# Patient Record
Sex: FEMALE | Race: WHITE | NOT HISPANIC OR LATINO | Employment: FULL TIME | URBAN - METROPOLITAN AREA
[De-identification: names, ages, dates, MRNs, and addresses within clinical notes are randomized per-mention and may not be internally consistent; named-entity substitution may affect disease eponyms.]

---

## 2017-05-17 ENCOUNTER — HOSPITAL ENCOUNTER (OUTPATIENT)
Facility: MEDICAL CENTER | Age: 27
End: 2017-05-17
Attending: NURSE PRACTITIONER

## 2017-05-17 ENCOUNTER — OFFICE VISIT (OUTPATIENT)
Dept: URGENT CARE | Facility: CLINIC | Age: 27
End: 2017-05-17

## 2017-05-17 VITALS
BODY MASS INDEX: 20.83 KG/M2 | HEIGHT: 65 IN | DIASTOLIC BLOOD PRESSURE: 60 MMHG | HEART RATE: 59 BPM | OXYGEN SATURATION: 95 % | WEIGHT: 125 LBS | SYSTOLIC BLOOD PRESSURE: 98 MMHG | TEMPERATURE: 99.9 F | RESPIRATION RATE: 14 BRPM

## 2017-05-17 DIAGNOSIS — N89.8 VAGINAL ODOR: ICD-10-CM

## 2017-05-17 LAB
CANDIDA DNA VAG QL PROBE+SIG AMP: NEGATIVE
G VAGINALIS DNA VAG QL PROBE+SIG AMP: POSITIVE
T VAGINALIS DNA VAG QL PROBE+SIG AMP: NEGATIVE

## 2017-05-17 PROCEDURE — 87480 CANDIDA DNA DIR PROBE: CPT

## 2017-05-17 PROCEDURE — 87510 GARDNER VAG DNA DIR PROBE: CPT

## 2017-05-17 PROCEDURE — 99202 OFFICE O/P NEW SF 15 MIN: CPT | Performed by: NURSE PRACTITIONER

## 2017-05-17 PROCEDURE — 87660 TRICHOMONAS VAGIN DIR PROBE: CPT

## 2017-05-17 ASSESSMENT — ENCOUNTER SYMPTOMS
CHILLS: 0
NAUSEA: 0
FLANK PAIN: 0
ABDOMINAL PAIN: 0
VAGINITIS: 1
FEVER: 0

## 2017-05-17 NOTE — MR AVS SNAPSHOT
"Naina Wilbert   2017 12:00 PM   Office Visit   MRN: [id number]    Department:  [facility]   Dept Phone:  [phone]    Description:  Female : 1990   Provider:  RADHA Amado           Reason for Visit     Vaginitis x months      Allergies as of 2017     Allergen Noted Reactions    Amoxicillin 2017       Penicillin G 2017         You were diagnosed with     Vaginal odor   [035970]         Vital Signs     Blood Pressure Pulse Temperature Respirations Height Weight    98/60 mmHg 59 37.7 °C (99.9 °F) 14 1.651 m (5' 5\") 56.7 kg (125 lb)    Body Mass Index Oxygen Saturation Breastfeeding? Smoking Status          20.80 kg/m2 95% No Current Some Day Smoker        Basic Information     Date Of Birth Sex Race Ethnicity Preferred Language    1990 Female White Non- English      Health Maintenance     Patient has no pending health maintenance at this time      Current Immunizations     No immunizations on file.      Below and/or attached are the medications your provider expects you to take. Review all of your home medications and newly ordered medications with your provider and/or pharmacist. Follow medication instructions as directed by your provider and/or pharmacist. Please keep your medication list with you and share with your provider. Update the information when medications are discontinued, doses are changed, or new medications (including over-the-counter products) are added; and carry medication information at all times in the event of emergency situations     Allergies:  AMOXICILLIN - (reactions not documented)     PENICILLIN G - (reactions not documented)               Medications  Valid as of: May 17, 2017 - 12:31 PM    Generic Name Brand Name Tablet Size Instructions for use    .                 Medicines prescribed today were sent to:     None      Medication refill instructions:       If your prescription bottle indicates you have medication " refills left, it is not necessary to call your provider’s office. Please contact your pharmacy and they will refill your medication.    If your prescription bottle indicates you do not have any refills left, you may request refills at any time through one of the following ways: The online Friendly Wager App system (except Urgent Care), by calling your provider’s office, or by asking your pharmacy to contact your provider’s office with a refill request. Medication refills are processed only during regular business hours and may not be available until the next business day. Your provider may request additional information or to have a follow-up visit with you prior to refilling your medication.   *Please Note: Medication refills are assigned a new Rx number when refilled electronically. Your pharmacy may indicate that no refills were authorized even though a new prescription for the same medication is available at the pharmacy. Please request the medicine by name with the pharmacy before contacting your provider for a refill.        Your To Do List     Future Labs/Procedures Complete By Expires    VAGINAL PATHOGENS DNA PANEL  As directed 5/17/2018         Friendly Wager App Status: Patient Declined        Quit Tobacco Information     Do you want to quit using tobacco?    Quitting tobacco decreases risks of cancer, heart and lung disease, increases life expectancy, improves sense of taste and smell, and increases spending money, among other benefits.    If you are thinking about quitting, we can help.  • [facility] Quit Tobacco Program: 195.924.2634  o Program occurs weekly for four weeks and includes pharmacist consultation on products to support quitting smoking or chewing tobacco. A provider referral is needed for pharmacist consultation.  • Tobacco Users Help Hotline: 4-709-QUIT-NOW (823-3588) or https://[place].quitlogix.org/  o Free, confidential telephone and online coaching for [place] residents. Sessions are designed on a schedule that  is convenient for you. Eligible clients receive free nicotine replacement therapy.  • Nationally: www.smokefree.gov  o Information and professional assistance to support both immediate and long-term needs as you become, and remain, a non-smoker. Smokefree.gov allows you to choose the help that best fits your needs.

## 2017-05-17 NOTE — PROGRESS NOTES
"Subjective:      Naina Atkins is a 26 y.o. female who presents with Vaginitis            Vaginitis  The patient's primary symptoms include a genital odor and vaginal discharge. This is a new problem. The current episode started 1 to 4 weeks ago. The problem occurs constantly. The problem has been unchanged. The patient is experiencing no pain. She is not pregnant. Pertinent negatives include no abdominal pain, chills, discolored urine, fever, flank pain, nausea, painful intercourse or urgency. There has been no bleeding. She is sexually active with multiple partners. It is possible that her partner has an STD.   Allergies, medications and history reviewed by me today      Review of Systems   Constitutional: Negative for fever and chills.   Gastrointestinal: Negative for nausea and abdominal pain.   Genitourinary: Positive for vaginal discharge. Negative for urgency and flank pain.          Objective:     BP 98/60 mmHg  Pulse 59  Temp(Src) 37.7 °C (99.9 °F)  Resp 14  Ht 1.651 m (5' 5\")  Wt 56.7 kg (125 lb)  BMI 20.80 kg/m2  SpO2 95%  Breastfeeding? No     Physical Exam   Constitutional: She is oriented to person, place, and time. She appears well-developed and well-nourished. No distress.   Cardiovascular: Normal rate, regular rhythm and normal heart sounds.    No murmur heard.  Pulmonary/Chest: Effort normal and breath sounds normal.   Genitourinary: Vaginal discharge found.   Musculoskeletal: Normal range of motion.   Neurological: She is alert and oriented to person, place, and time.   Skin: Skin is warm and dry. No rash noted. No erythema.   Psychiatric: She has a normal mood and affect. Her behavior is normal. Thought content normal.   Nursing note and vitals reviewed.              Assessment/Plan:     1. Vaginal odor  VAGINAL PATHOGENS DNA PANEL     Will call with results, ok to leave message.      "

## 2017-05-19 ENCOUNTER — TELEPHONE (OUTPATIENT)
Dept: URGENT CARE | Facility: CLINIC | Age: 27
End: 2017-05-19

## 2017-05-19 DIAGNOSIS — N76.0 BACTERIAL VAGINOSIS: ICD-10-CM

## 2017-05-19 DIAGNOSIS — B96.89 BACTERIAL VAGINOSIS: ICD-10-CM

## 2017-05-19 RX ORDER — METRONIDAZOLE 500 MG/1
500 TABLET ORAL 2 TIMES DAILY
Qty: 14 TAB | Refills: 0 | Status: SHIPPED | OUTPATIENT
Start: 2017-05-19 | End: 2017-05-26

## 2018-03-13 ENCOUNTER — OFFICE VISIT (OUTPATIENT)
Dept: MEDICAL GROUP | Facility: CLINIC | Age: 28
End: 2018-03-13
Payer: MEDICAID

## 2018-03-13 ENCOUNTER — HOSPITAL ENCOUNTER (OUTPATIENT)
Facility: MEDICAL CENTER | Age: 28
End: 2018-03-13
Attending: NURSE PRACTITIONER
Payer: MEDICAID

## 2018-03-13 VITALS
TEMPERATURE: 99 F | OXYGEN SATURATION: 99 % | HEART RATE: 66 BPM | BODY MASS INDEX: 20.83 KG/M2 | SYSTOLIC BLOOD PRESSURE: 120 MMHG | RESPIRATION RATE: 16 BRPM | DIASTOLIC BLOOD PRESSURE: 60 MMHG | WEIGHT: 125 LBS | HEIGHT: 65 IN

## 2018-03-13 DIAGNOSIS — Z01.419 WELL WOMAN EXAM WITH ROUTINE GYNECOLOGICAL EXAM: ICD-10-CM

## 2018-03-13 DIAGNOSIS — R53.83 FATIGUE, UNSPECIFIED TYPE: ICD-10-CM

## 2018-03-13 LAB — CYTOLOGY REG CYTOL: NORMAL

## 2018-03-13 PROCEDURE — 83540 ASSAY OF IRON: CPT

## 2018-03-13 PROCEDURE — 85025 COMPLETE CBC W/AUTO DIFF WBC: CPT

## 2018-03-13 PROCEDURE — 99000 SPECIMEN HANDLING OFFICE-LAB: CPT | Performed by: NURSE PRACTITIONER

## 2018-03-13 PROCEDURE — 88175 CYTOPATH C/V AUTO FLUID REDO: CPT

## 2018-03-13 PROCEDURE — 99395 PREV VISIT EST AGE 18-39: CPT | Performed by: NURSE PRACTITIONER

## 2018-03-13 PROCEDURE — 83550 IRON BINDING TEST: CPT

## 2018-03-13 PROCEDURE — 84443 ASSAY THYROID STIM HORMONE: CPT

## 2018-03-13 ASSESSMENT — PAIN SCALES - GENERAL: PAINLEVEL: NO PAIN

## 2018-03-13 ASSESSMENT — PATIENT HEALTH QUESTIONNAIRE - PHQ9: CLINICAL INTERPRETATION OF PHQ2 SCORE: 0

## 2018-03-13 NOTE — PROGRESS NOTES
CC:  Pap/Well Woman Exam    HPI:     Naina Atkins is 27 y.o female presenting today for well woman exam with gynecological exam and Pap smear.   She reports her periods are regular, mothly.  She is currently using   for birth control. She reports several sexual partners in the last 6 months, reports also using protection, she is tested weekly for G/C per her job regulations.     Fatigue  This is a new problem. Patient does admit to fatigue, no matter how often she is able to get sleep. 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 Denies any history of anemia, thyroid. Denies alcohol use. She does smoke marijuana. Denies any history of syncope, seizure.      She  has no past medical history of Asthma.     She  has a past surgical history that includes appendectomy.     History   Sexual Activity   • Sexual activity: Yes   • Partners: Female, Male   • Birth control/ protection: Pill       No outpatient encounter prescriptions on file as of 3/13/2018.     No facility-administered encounter medications on file as of 3/13/2018.        Patient Active Problem List    Diagnosis Date Noted   • Fatigue 03/13/2018       .  Social History     Social History   • Marital status: Single     Spouse name: N/A   • Number of children: N/A   • Years of education: N/A     Occupational History   • Not on file.     Social History Main Topics   • Smoking status: Current Some Day Smoker     Types: Cigarettes   • Smokeless tobacco: Never Used      Comment: very rare   • Alcohol use Yes      Comment: on occ   • Drug use: Yes     Types: Marijuana   • Sexual activity: Yes     Partners: Female, Male     Birth control/ protection: Pill     Other Topics Concern   • Not on file     Social History Narrative   • No narrative on file       Family History   Problem Relation Age of Onset   • No Known Problems Mother    • No Known Problems Father    • Cancer Maternal Grandmother      breast         ROS: Positive for fatigue. Denies Weight  "loss, chest pain, SOB, bowel or bladder changes. No significant dysmenorrhea, concerning vaginal discharge or irritation, no dyspareunia or postcoital bleeding. Denies h/o migraine with aura. Denies musculoskeletal, neurological, or psychiatric problems.    All other systems reviewed and are negative.       /60   Pulse 66   Temp 37.2 °C (99 °F)   Resp 16   Ht 1.651 m (5' 5\")   Wt 56.7 kg (125 lb)   LMP 02/09/2018   SpO2 99%   Breastfeeding? No   BMI 20.80 kg/m²     Physical Exam:  Dian Valverde MA present during exam.     GEN:  Appears well and in no apparent distress   NECK:  Supple without adenopathy or thyromegaly  LUNGS:  Clear and equal. No wheeze, ronchi, or rales.  CV:  RRR, S1, S2. No murmur.  Pedal pulses 2+ bilaterally.  BREAST:  Declines exam, denies any complaints   ABD:  Soft, non-tender, non-distended, normal bowel sounds.  No hepatosplenomegaly.  :  Normal external female genitalia.  Vaginal canal clear.  Cervix appears without polyps. Nabothian cysts noted 4 oclock, there is some what appears to be trauma near OS. Specimen collected from transformation zone. Bimanual exam:  No CMT, normal size uterus without masses or tenderness; no adnexal masses or tenderness.       Assessment and plan:    1. Well woman exam with routine gynecological exam  Will contact with results.   - THINPREP PAP,REFLEX HPV ON ASC-US ONLY; Future    2. Fatigue, unspecified type  - CBC WITH DIFFERENTIAL; Future  - TSH WITH REFLEX TO FT4; Future  - IRON/TOTAL IRON BIND; Future       Reviewed risks and benefits of treatment plan. Patient verbally agrees to plan of care.       F/u pending results  Return for Lab follow up.    ALFREDITO Linda.     PLEASE NOTE: This dictation was created using voice recognition software. I have made every reasonable attempt to correct obvious errors, but I expect that there may be errors of grammar and possibly content that I did not discover prior finalizing this " note.

## 2018-03-13 NOTE — PATIENT INSTRUCTIONS
Your medical care was provided today by: NICOLA Knowles    Thank You for the opportunity to serve you.    You may receive a brief survey in the mail shortly regarding your visit today. Please take a few moments to complete the survey and return it; no postage is necessary. We are working to serve our patient population better, improve customer service and our patients overall experience and your input can help us to accomplish this. We thank you for your help and for the opportunity to serve you today and in the future.     Special Instructions:  Always call 9-1-1 immediately if you develop a life threatening emergency.    Unless told otherwise please take all medications as directed and complete prescription therapies.     Watch for the following signs that require additional evaluation: progressive lethargy or unresponsiveness, localized pain (chest, abdomen), shortness of breath, painful breathing, progressive vomiting with weakness, bloody stools, or new rash.     If you are prescribed pain medication or any other medication that is sedating, do not take medication before or while operating a vehicle or heavy machinery or equipment due to potential side effects such as drowsiness and/or dizziness.

## 2018-03-13 NOTE — ASSESSMENT & PLAN NOTE
This is a new problem. Patient does admit to fatigue, no matter how often she is able to get sleep. 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 Denies any history of anemia, thyroid. Denies alcohol use. She does smoke marijuana. Denies any history of syncope, seizure.

## 2018-03-14 LAB
BASOPHILS # BLD AUTO: 0.5 % (ref 0–1.8)
BASOPHILS # BLD: 0.04 K/UL (ref 0–0.12)
EOSINOPHIL # BLD AUTO: 0.42 K/UL (ref 0–0.51)
EOSINOPHIL NFR BLD: 5.1 % (ref 0–6.9)
ERYTHROCYTE [DISTWIDTH] IN BLOOD BY AUTOMATED COUNT: 46.9 FL (ref 35.9–50)
HCT VFR BLD AUTO: 45.4 % (ref 37–47)
HGB BLD-MCNC: 14.3 G/DL (ref 12–16)
IMM GRANULOCYTES # BLD AUTO: 0.03 K/UL (ref 0–0.11)
IMM GRANULOCYTES NFR BLD AUTO: 0.4 % (ref 0–0.9)
IRON SATN MFR SERPL: 31 % (ref 15–55)
IRON SERPL-MCNC: 113 UG/DL (ref 40–170)
LYMPHOCYTES # BLD AUTO: 2.21 K/UL (ref 1–4.8)
LYMPHOCYTES NFR BLD: 26.6 % (ref 22–41)
MCH RBC QN AUTO: 29.7 PG (ref 27–33)
MCHC RBC AUTO-ENTMCNC: 31.5 G/DL (ref 33.6–35)
MCV RBC AUTO: 94.2 FL (ref 81.4–97.8)
MONOCYTES # BLD AUTO: 0.49 K/UL (ref 0–0.85)
MONOCYTES NFR BLD AUTO: 5.9 % (ref 0–13.4)
NEUTROPHILS # BLD AUTO: 5.11 K/UL (ref 2–7.15)
NEUTROPHILS NFR BLD: 61.5 % (ref 44–72)
NRBC # BLD AUTO: 0 K/UL
NRBC BLD-RTO: 0 /100 WBC
PLATELET # BLD AUTO: 239 K/UL (ref 164–446)
PMV BLD AUTO: 12.8 FL (ref 9–12.9)
RBC # BLD AUTO: 4.82 M/UL (ref 4.2–5.4)
TIBC SERPL-MCNC: 370 UG/DL (ref 250–450)
TSH SERPL DL<=0.005 MIU/L-ACNC: 1.64 UIU/ML (ref 0.38–5.33)
WBC # BLD AUTO: 8.3 K/UL (ref 4.8–10.8)

## 2018-09-13 ENCOUNTER — OFFICE VISIT (OUTPATIENT)
Dept: URGENT CARE | Facility: CLINIC | Age: 28
End: 2018-09-13

## 2018-09-13 ENCOUNTER — HOSPITAL ENCOUNTER (OUTPATIENT)
Facility: MEDICAL CENTER | Age: 28
End: 2018-09-13
Attending: PHYSICIAN ASSISTANT

## 2018-09-13 VITALS
WEIGHT: 131.8 LBS | RESPIRATION RATE: 14 BRPM | DIASTOLIC BLOOD PRESSURE: 70 MMHG | HEART RATE: 70 BPM | BODY MASS INDEX: 21.96 KG/M2 | SYSTOLIC BLOOD PRESSURE: 122 MMHG | TEMPERATURE: 98.3 F | HEIGHT: 65 IN | OXYGEN SATURATION: 97 %

## 2018-09-13 DIAGNOSIS — N30.00 ACUTE CYSTITIS WITHOUT HEMATURIA: ICD-10-CM

## 2018-09-13 LAB
APPEARANCE UR: CLEAR
BILIRUB UR STRIP-MCNC: ABNORMAL MG/DL
COLOR UR AUTO: YELLOW
GLUCOSE UR STRIP.AUTO-MCNC: ABNORMAL MG/DL
KETONES UR STRIP.AUTO-MCNC: ABNORMAL MG/DL
LEUKOCYTE ESTERASE UR QL STRIP.AUTO: ABNORMAL
NITRITE UR QL STRIP.AUTO: ABNORMAL
PH UR STRIP.AUTO: 70 [PH] (ref 5–8)
PROT UR QL STRIP: ABNORMAL MG/DL
RBC UR QL AUTO: ABNORMAL
SP GR UR STRIP.AUTO: 1.02
UROBILINOGEN UR STRIP-MCNC: ABNORMAL MG/DL

## 2018-09-13 PROCEDURE — 99214 OFFICE O/P EST MOD 30 MIN: CPT | Performed by: PHYSICIAN ASSISTANT

## 2018-09-13 PROCEDURE — 87086 URINE CULTURE/COLONY COUNT: CPT

## 2018-09-13 PROCEDURE — 81002 URINALYSIS NONAUTO W/O SCOPE: CPT | Performed by: PHYSICIAN ASSISTANT

## 2018-09-13 RX ORDER — FLUCONAZOLE 150 MG/1
TABLET ORAL
Qty: 2 TAB | Refills: 0 | Status: SHIPPED | OUTPATIENT
Start: 2018-09-13

## 2018-09-13 RX ORDER — NITROFURANTOIN 25; 75 MG/1; MG/1
100 CAPSULE ORAL EVERY 12 HOURS
Qty: 10 CAP | Refills: 0 | Status: SHIPPED | OUTPATIENT
Start: 2018-09-13 | End: 2018-09-18

## 2018-09-13 ASSESSMENT — ENCOUNTER SYMPTOMS
FLANK PAIN: 0
FEVER: 0
VOMITING: 0
CHILLS: 0
NAUSEA: 0
SHORTNESS OF BREATH: 0
DIARRHEA: 0
ABDOMINAL PAIN: 0

## 2018-09-13 NOTE — PROGRESS NOTES
"Subjective:   Naina Atkins is a 27 y.o. female who presents for UTI (x 2 weeks )        UTI   This is a recurrent problem. The current episode started 1 to 4 weeks ago. Pertinent negatives include no abdominal pain, chills, fever, nausea, rash or vomiting.     Notes was tx'ed w/ cipro for kidney infection 3wks of \"kidney infection\", notes s/sx of UTI resolved, fatigue persisited, last one week of feeling, notes right flank pain today, yesterday w/ dysuria, some suprapubic discomfort, denies fever/chills/emesis, c/o nausea, denies hematuria. Notes some darker urine. PMH of UTI, denies pyelo in the past, PMH of kidney stones, LMP 2wks ago. Denies chance of preg now. PMH of ovarian cyst.     Review of Systems   Constitutional: Negative for chills and fever.   Respiratory: Negative for shortness of breath.    Gastrointestinal: Negative for abdominal pain, diarrhea, nausea and vomiting.   Genitourinary: Positive for dysuria, frequency and urgency. Negative for flank pain and hematuria.   Skin: Negative for rash.     Allergies   Allergen Reactions   • Amoxicillin    • Penicillin G       Objective:   /70   Pulse 70   Temp 36.8 °C (98.3 °F)   Resp 14   Ht 1.651 m (5' 5\")   Wt 59.8 kg (131 lb 12.8 oz)   LMP 09/05/2018   SpO2 97%   Breastfeeding? No   BMI 21.93 kg/m²   Physical Exam   Constitutional: She is oriented to person, place, and time. She appears well-developed and well-nourished. No distress.   HENT:   Head: Normocephalic and atraumatic.   Right Ear: External ear normal.   Left Ear: External ear normal.   Nose: Nose normal.   Eyes: Conjunctivae and lids are normal. Right eye exhibits no discharge. Left eye exhibits no discharge. No scleral icterus.   Neck: Neck supple.   Pulmonary/Chest: Effort normal. No respiratory distress.   Abdominal: Soft. Normal appearance. There is tenderness ( very mild suprapubic) in the suprapubic area. There is no rigidity, no guarding and no CVA tenderness. "   Musculoskeletal: Normal range of motion.   Neurological: She is alert and oriented to person, place, and time. She is not disoriented.   Skin: Skin is warm and dry. She is not diaphoretic. No erythema. No pallor.   Psychiatric: Her speech is normal and behavior is normal.   Nursing note and vitals reviewed.  POCT UA - +  POCT HCG - NEG    Urine cx pending will call w/ results     Assessment/Plan:   Assessment    1. Acute cystitis without hematuria  - nitrofurantoin monohydr macro (MACROBID) 100 MG Cap; Take 1 Cap by mouth every 12 hours for 5 days.  Dispense: 10 Cap; Refill: 0  - fluconazole (DIFLUCAN) 150 MG tablet; Take one tab PO day #1, wait 4 days, if still w/ s/sx take 2nd tab PO  Dispense: 2 Tab; Refill: 0  - POCT Urinalysis  - URINE CULTURE(NEW); Future  Supportive care is reviewed with patient/caregiver - recommend to push PO fluids and electrolytes, nsaids/tylenol, rest, full course of abx, probiotics w/ abx, azo/cran, observe for resolution  Return to clinic with lack of resolution or progression of symptoms.  Will call w/ results  Differential diagnosis, natural history, supportive care, and indications for immediate follow-up discussed.

## 2018-09-13 NOTE — LETTER
September 13, 2018       Patient: Naina Atkins   YOB: 1990   Date of Visit: 9/13/2018         To Whom It May Concern:    It is my medical opinion that Naina Aktins should be excused from work for today due to illness.        If you have any questions or concerns, please don't hesitate to call [phone]          Sincerely,          Jaden Meneses P.A.-C.  Electronically Signed

## 2018-09-14 DIAGNOSIS — N30.00 ACUTE CYSTITIS WITHOUT HEMATURIA: ICD-10-CM

## 2018-09-16 LAB
BACTERIA UR CULT: NORMAL
SIGNIFICANT IND 70042: NORMAL
SITE SITE: NORMAL
SOURCE SOURCE: NORMAL

## 2019-01-29 ENCOUNTER — HOSPITAL ENCOUNTER (EMERGENCY)
Dept: HOSPITAL 8 - ED | Age: 29
Discharge: HOME | End: 2019-01-29
Payer: MEDICAID

## 2019-01-29 VITALS — WEIGHT: 121.25 LBS | BODY MASS INDEX: 19.49 KG/M2 | HEIGHT: 66 IN

## 2019-01-29 VITALS — DIASTOLIC BLOOD PRESSURE: 66 MMHG | SYSTOLIC BLOOD PRESSURE: 118 MMHG
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
## 2019-01-29 DIAGNOSIS — S62.316A: Primary | ICD-10-CM
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
## 2019-01-29 PROCEDURE — 29125 APPL SHORT ARM SPLINT STATIC: CPT

## 2019-01-29 PROCEDURE — 99283 EMERGENCY DEPT VISIT LOW MDM: CPT

## 2019-02-05 ENCOUNTER — HOSPITAL ENCOUNTER (OUTPATIENT)
Dept: HOSPITAL 8 - OUT | Age: 29
Discharge: HOME | End: 2019-02-05
Attending: ORTHOPAEDIC SURGERY
Payer: MEDICAID

## 2019-02-05 VITALS — DIASTOLIC BLOOD PRESSURE: 70 MMHG | SYSTOLIC BLOOD PRESSURE: 110 MMHG

## 2019-02-05 VITALS — HEIGHT: 66 IN | BODY MASS INDEX: 19.7 KG/M2 | WEIGHT: 122.58 LBS

## 2019-02-05 DIAGNOSIS — Z88.0: ICD-10-CM
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
## 2019-02-05 DIAGNOSIS — Z88.8: ICD-10-CM
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
## 2019-02-05 DIAGNOSIS — Z88.1: ICD-10-CM
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
## 2019-02-05 DIAGNOSIS — S62.326A: Primary | ICD-10-CM

## 2019-02-05 LAB — HCG UR SG: 1.02 (ref 1–1.03)

## 2019-02-05 PROCEDURE — 73140 X-RAY EXAM OF FINGER(S): CPT

## 2019-02-05 PROCEDURE — 81025 URINE PREGNANCY TEST: CPT

## 2019-02-05 PROCEDURE — 76000 FLUOROSCOPY <1 HR PHYS/QHP: CPT

## 2019-02-05 PROCEDURE — 26615 TREAT METACARPAL FRACTURE: CPT

## 2019-02-05 PROCEDURE — C1713 ANCHOR/SCREW BN/BN,TIS/BN: HCPCS

## 2019-02-05 RX ADMIN — MEPERIDINE HYDROCHLORIDE PRN MG: 25 INJECTION, SOLUTION INTRAMUSCULAR; INTRAVENOUS; SUBCUTANEOUS at 11:33

## 2019-02-05 RX ADMIN — OXYCODONE HYDROCHLORIDE PRN MG: 5 SOLUTION ORAL at 13:00

## 2019-02-05 RX ADMIN — OXYCODONE HYDROCHLORIDE PRN MG: 5 SOLUTION ORAL at 11:57

## 2019-02-05 RX ADMIN — MEPERIDINE HYDROCHLORIDE PRN MG: 25 INJECTION, SOLUTION INTRAMUSCULAR; INTRAVENOUS; SUBCUTANEOUS at 11:26

## 2019-02-05 RX ADMIN — FENTANYL CITRATE PRN MCG: 50 INJECTION INTRAMUSCULAR; INTRAVENOUS at 11:35

## 2019-02-05 RX ADMIN — FENTANYL CITRATE PRN MCG: 50 INJECTION INTRAMUSCULAR; INTRAVENOUS at 11:30

## 2019-02-05 RX ADMIN — FENTANYL CITRATE PRN MCG: 50 INJECTION INTRAMUSCULAR; INTRAVENOUS at 11:53

## 2019-02-07 ENCOUNTER — HOSPITAL ENCOUNTER (EMERGENCY)
Dept: HOSPITAL 8 - ED | Age: 29
Discharge: HOME | End: 2019-02-07
Payer: SELF-PAY

## 2019-02-07 VITALS — BODY MASS INDEX: 20.39 KG/M2 | WEIGHT: 122.36 LBS | HEIGHT: 65 IN

## 2019-02-07 VITALS — DIASTOLIC BLOOD PRESSURE: 55 MMHG | SYSTOLIC BLOOD PRESSURE: 99 MMHG

## 2019-02-07 DIAGNOSIS — M25.531: Primary | ICD-10-CM

## 2019-02-07 DIAGNOSIS — L03.113: ICD-10-CM

## 2019-02-07 LAB
ALBUMIN SERPL-MCNC: 3.7 G/DL (ref 3.4–5)
ANION GAP SERPL CALC-SCNC: 7 MMOL/L (ref 5–15)
BASOPHILS # BLD AUTO: 0.07 X10^3/UL (ref 0–0.1)
BASOPHILS NFR BLD AUTO: 1 % (ref 0–1)
CALCIUM SERPL-MCNC: 8.2 MG/DL (ref 8.5–10.1)
CHLORIDE SERPL-SCNC: 111 MMOL/L (ref 98–107)
CREAT SERPL-MCNC: 0.66 MG/DL (ref 0.55–1.02)
CRP SERPL-MCNC: 0.03 MG/DL (ref 0.02–0.49)
EOSINOPHIL # BLD AUTO: 0.62 X10^3/UL (ref 0–0.4)
EOSINOPHIL NFR BLD AUTO: 6 % (ref 1–7)
ERYTHROCYTE [DISTWIDTH] IN BLOOD BY AUTOMATED COUNT: 13.3 % (ref 9.6–15.2)
ERYTHROCYTE [SEDIMENTATION RATE] IN BLOOD BY WESTERGREN METHOD: 4 MM/HR (ref 0–20)
HCT (SEDRATE): 39.6 % (ref 34.6–47.8)
LYMPHOCYTES # BLD AUTO: 3.1 X10^3/UL (ref 1–3.4)
LYMPHOCYTES NFR BLD AUTO: 29 % (ref 22–44)
MCH RBC QN AUTO: 29.8 PG (ref 27–34.8)
MCHC RBC AUTO-ENTMCNC: 33.7 G/DL (ref 32.4–35.8)
MCV RBC AUTO: 88.3 FL (ref 80–100)
MD: NO
MONOCYTES # BLD AUTO: 0.78 X10^3/UL (ref 0.2–0.8)
MONOCYTES NFR BLD AUTO: 7 % (ref 2–9)
NEUTROPHILS # BLD AUTO: 6.18 X10^3/UL (ref 1.8–6.8)
NEUTROPHILS NFR BLD AUTO: 58 % (ref 42–75)
PLATELET # BLD AUTO: 214 X10^3/UL (ref 130–400)
PMV BLD AUTO: 10.1 FL (ref 7.4–10.4)
RBC # BLD AUTO: 4.49 X10^6/UL (ref 3.82–5.3)

## 2019-02-07 PROCEDURE — 82040 ASSAY OF SERUM ALBUMIN: CPT

## 2019-02-07 PROCEDURE — 86140 C-REACTIVE PROTEIN: CPT

## 2019-02-07 PROCEDURE — 96375 TX/PRO/DX INJ NEW DRUG ADDON: CPT

## 2019-02-07 PROCEDURE — 85025 COMPLETE CBC W/AUTO DIFF WBC: CPT

## 2019-02-07 PROCEDURE — 29125 APPL SHORT ARM SPLINT STATIC: CPT

## 2019-02-07 PROCEDURE — 80048 BASIC METABOLIC PNL TOTAL CA: CPT

## 2019-02-07 PROCEDURE — 96376 TX/PRO/DX INJ SAME DRUG ADON: CPT

## 2019-02-07 PROCEDURE — 73110 X-RAY EXAM OF WRIST: CPT

## 2019-02-07 PROCEDURE — 85651 RBC SED RATE NONAUTOMATED: CPT

## 2019-02-07 PROCEDURE — 96374 THER/PROPH/DIAG INJ IV PUSH: CPT

## 2019-02-07 PROCEDURE — 99284 EMERGENCY DEPT VISIT MOD MDM: CPT

## 2019-02-07 PROCEDURE — 36415 COLL VENOUS BLD VENIPUNCTURE: CPT

## 2019-02-07 RX ADMIN — MORPHINE SULFATE PRN MG: 4 INJECTION INTRAVENOUS at 19:30

## 2019-02-07 RX ADMIN — MORPHINE SULFATE PRN MG: 4 INJECTION INTRAVENOUS at 18:51

## 2019-02-07 NOTE — NUR
RECEIVED BS REPORT FROM AMOR REHMAN.  HE ESTABLISHED IV AND THIS RN MEDICATED PT 
PER MAR.  LABS HAVE BEEN DRWAN AND X-RAYS COMPLETED.  CONTINUOUS PULSE OX IN 
PLACE.  CALL LIGHT IN REACH.  2 PILLOWS PROVIDED.  WARM BLANKETS PROVIDED.  PT. 
SCREAMING THAT PAIN IS 10/10 PRIOR TO MED ADMIN.  ALL SAFETY MEASURES OBSERVED.

## 2019-02-07 NOTE — NUR
PT. EDUCATED ON WHY ICE CAN'T BE LEFT ON HAND FOR TOO LONG OF A PERIOD AND WHY 
ICE NEEDS TO BE USED IN 20 MIN OR LESS PERIORS AND HAND ALLOWED TO RE-WARM.  
PT. VERBALIZED UNDERSTANDING OF THIS.  BOYFRIEND ALSO AT BS. DR. WHEELER IN TO 
DISCUSS POC WITH PT. ANOTHER WARM BLANKET PROVIDED PER REQUEST.  PT. REPORST 
PAIN IS DOWN TO 6/10 IN RIGHT HAND.  DENIES OTHER NEEDS.  CALL LIGHT IN REACH.  
ALL SAFETY MEASURES OBSERVED.

## 2019-02-07 NOTE — NUR
AT THIS TIME PT. REPORTS PAIN IS DOWN TO 8/10; PT. DECLINED OFFER OF 2ND DOSE 
OF MORPHINE.  PT. CALM AND COOPERATIVE AT THIS TIME.  ICE IN USE ON RIGHT HAND. 
 ALL SAFETY MEASURES MAINTAINED.  PT. VERBALIZED SHE WILL CALL FOR MORE PAIN 
MEDS IF NEEDED.

## 2019-06-22 ENCOUNTER — HOSPITAL ENCOUNTER (EMERGENCY)
Facility: MEDICAL CENTER | Age: 29
End: 2019-06-22
Attending: EMERGENCY MEDICINE

## 2019-06-22 VITALS
HEART RATE: 77 BPM | OXYGEN SATURATION: 98 % | SYSTOLIC BLOOD PRESSURE: 119 MMHG | BODY MASS INDEX: 21.12 KG/M2 | RESPIRATION RATE: 18 BRPM | DIASTOLIC BLOOD PRESSURE: 63 MMHG | HEIGHT: 65 IN | TEMPERATURE: 97.9 F | WEIGHT: 126.76 LBS

## 2019-06-22 DIAGNOSIS — R10.9 FLANK PAIN: ICD-10-CM

## 2019-06-22 LAB
APPEARANCE UR: CLEAR
BACTERIA #/AREA URNS HPF: ABNORMAL /HPF
BILIRUB UR QL STRIP.AUTO: NEGATIVE
COLOR UR: YELLOW
EPI CELLS #/AREA URNS HPF: ABNORMAL /HPF
GLUCOSE UR STRIP.AUTO-MCNC: NEGATIVE MG/DL
HCG UR QL: NEGATIVE
HYALINE CASTS #/AREA URNS LPF: ABNORMAL /LPF
KETONES UR STRIP.AUTO-MCNC: NEGATIVE MG/DL
LEUKOCYTE ESTERASE UR QL STRIP.AUTO: ABNORMAL
MICRO URNS: ABNORMAL
NITRITE UR QL STRIP.AUTO: NEGATIVE
PH UR STRIP.AUTO: 6.5 [PH]
PROT UR QL STRIP: NEGATIVE MG/DL
RBC # URNS HPF: ABNORMAL /HPF
RBC UR QL AUTO: NEGATIVE
SP GR UR STRIP.AUTO: 1.02
UROBILINOGEN UR STRIP.AUTO-MCNC: 0.2 MG/DL
WBC #/AREA URNS HPF: ABNORMAL /HPF

## 2019-06-22 PROCEDURE — 81001 URINALYSIS AUTO W/SCOPE: CPT

## 2019-06-22 PROCEDURE — 81025 URINE PREGNANCY TEST: CPT

## 2019-06-22 PROCEDURE — 99283 EMERGENCY DEPT VISIT LOW MDM: CPT

## 2019-06-22 ASSESSMENT — PAIN SCALES - WONG BAKER: WONGBAKER_NUMERICALRESPONSE: HURTS A LITTLE MORE

## 2019-06-22 NOTE — ED TRIAGE NOTES
Chief Complaint   Patient presents with   • Blood in Urine     c/o bloody urine today.   • Flank Pain     bilateral flank x 2 days. has hx of recurrent kidney infection   • Tired     NAD. Educated on triage process. Instructed to notify staff for any worsening symptoms.

## 2019-06-22 NOTE — ED PROVIDER NOTES
ED Provider Note    Scribed for Shira Bauer M.D. by Omaira Chapman. 6/22/2019, 12:44 PM.    Primary care provider: LUPE Linda  Means of arrival: Walk-in  History obtained from: Patient  History limited by: None    CHIEF COMPLAINT  Chief Complaint   Patient presents with   • Blood in Urine     c/o bloody urine today.   • Flank Pain     bilateral flank x 2 days. has hx of recurrent kidney infection   • Tired     HPI  Naina Atkins is a 28 y.o. female with a history of kidney infections who presents to the Emergency Department complaining of blood in her urine onset a couple hours before my exam. 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 She notes that she has been feeling increased fatigue and soreness for the past few days and notes associated flank pain. She states her left side hurts more than her right. The patient denies any dysuria, abdominal pain, or trauma but endorses vaginal discharge and suprapubic soreness. She states she drinks cranberry pills and drinks 3-4 L of water daily. She assures that her occupation includes weekly STD testing and condoms usage. The patient states she smokes marijuana.  She is mostly concerned about possible urinary tract infection given her history.    REVIEW OF SYSTEMS  Pertinent positives include hematuria, flank pain, suprapubic soreness, and vaginal discharge. Pertinent negatives include no dysuria, abdominal pain, or trauma.      SURGICAL HISTORY   has a past surgical history that includes appendectomy.    SOCIAL HISTORY  Social History   Substance Use Topics   • Smoking status: Former Smoker     Types: Cigarettes   • Smokeless tobacco: Never Used      Comment: very rare   • Alcohol use No      Comment: on occ      History   Drug Use   • Types: Marijuana       FAMILY HISTORY  Family History   Problem Relation Age of Onset   • No Known Problems Mother    • No Known Problems Father    • Cancer  "Maternal Grandmother         breast       CURRENT MEDICATIONS  Home Medications     Reviewed by Jannet Costa R.N. (Registered Nurse) on 06/22/19 at 1204  Med List Status: Complete   Medication Last Dose Status   fluconazole (DIFLUCAN) 150 MG tablet not taking Active                ALLERGIES  Allergies   Allergen Reactions   • Amoxicillin    • Ceclor [Cefaclor]    • Penicillin G        PHYSICAL EXAM  VITAL SIGNS: /63   Pulse 77   Temp 36.6 °C (97.9 °F) (Temporal)   Resp 18   Ht 1.651 m (5' 5\")   Wt 57.5 kg (126 lb 12.2 oz)   LMP 05/22/2019   SpO2 98%   BMI 21.09 kg/m²   Constitutional: Alert in no apparent distress.  HENT: No signs of trauma, Bilateral external ears normal, Nose normal.   Cardiovascular: Regular rate and rhythm, no murmurs.   Thorax & Lungs: Normal breath sounds, No respiratory distress, No wheezing, No chest tenderness.   Abdomen: Bowel sounds normal, Soft, No tenderness, No masses, No peritoneal signs.  Skin: Warm, Dry, No erythema, No rash.   Back: No bony tenderness, No CVA tenderness.   Musculoskeletal:  No major deformities noted.   Neurologic: Alert, moving all extremities without difficulty, no focal deficits.    LABS  Labs Reviewed   URINALYSIS,CULTURE IF INDICATED - Abnormal; Notable for the following:        Result Value    Leukocyte Esterase Small (*)     All other components within normal limits   URINE MICROSCOPIC (W/UA) - Abnormal; Notable for the following:     Bacteria Few (*)     All other components within normal limits   HCG QUALITATIVE UR   REFRACTOMETER SG     All labs reviewed by me.    COURSE & MEDICAL DECISION MAKING  Pertinent Labs & Imaging studies reviewed. (See chart for details)    12:44 PM - Patient seen and examined at bedside. Discussed with patient I will order urine tests to assess her symptoms. Ordered Urine microscopic, UA, Beta-HCG qualitative, and Refractometer SG to evaluate her symptoms.      1:12 PM - Patient was reevaluated at bedside. " Discussed lab results with the patient and informed them that her urine test came back normal.  She has weekly STD testing and reports using safe sex practices and therefore I do not think pelvic exam is necessary at this time.  I advised her to drink lots of fluids and to continue drinking cranberry juice. Moreover, Tylenol and Ibuprofen for pain. Discharge plan as outlined below discussed. Patient understands and agrees.       FINAL IMPRESSION  1. Flank pain      FOLLOW UP  Km Aviles A.P.R.N.  [address] [facility] [address]  [facility] [id number] [phone]      As needed    [facility], Emergency Dept  [address] [facility] [id number] [phone]    Return for worsening pain, fever, vomiting or other concerns    -DISCHARGE-    FINAL IMPRESSION  1. Flank pain         This dictation has been created using voice recognition software and/or scribes. The accuracy of the dictation is limited by the abilities of the software and the expertise of the scribes. I expect there may be some errors of grammar and possibly content. I made every attempt to manually correct the errors within my dictation. However, errors related to voice recognition software and/or scribes may still exist and should be interpreted within the appropriate context.     I, Omaira Chapman (Scribe), am scribing for, and in the presence of, Shira Bauer M.D..    Electronically signed by: Omaira Chapman (Scribe), 6/22/2019    IShira M.D. personally performed the services described in this documentation, as scribed by Omaira Chapman in my presence, and it is both accurate and complete.    E    The note accurately reflects work and decisions made by me.  Shira Bauer  6/22/2019  2:38 PM